# Patient Record
Sex: FEMALE | Race: BLACK OR AFRICAN AMERICAN | URBAN - NONMETROPOLITAN AREA
[De-identification: names, ages, dates, MRNs, and addresses within clinical notes are randomized per-mention and may not be internally consistent; named-entity substitution may affect disease eponyms.]

---

## 2022-09-30 ENCOUNTER — OFFICE VISIT (OUTPATIENT)
Dept: URBAN - NONMETROPOLITAN AREA CLINIC 1 | Facility: CLINIC | Age: 32
End: 2022-09-30
Payer: COMMERCIAL

## 2022-09-30 DIAGNOSIS — H52.13 MYOPIA, BILATERAL: Primary | ICD-10-CM

## 2022-09-30 PROCEDURE — 92004 COMPRE OPH EXAM NEW PT 1/>: CPT | Performed by: OPTOMETRIST

## 2022-09-30 ASSESSMENT — KERATOMETRY
OS: 45.13
OD: 44.88

## 2022-09-30 ASSESSMENT — VISUAL ACUITY
OS: 20/20
OD: 20/20

## 2022-09-30 ASSESSMENT — INTRAOCULAR PRESSURE
OS: 20
OD: 20

## 2022-09-30 NOTE — IMPRESSION/PLAN
Impression: Myopia, bilateral: H52.13. Plan: Educated pt on exam findings. Updated and finalized SRx. Educated pt on 1-2 week adaptation period with updated SRx. Pt expressed understanding. RTC 1 year for CE, or PRN. Discussed possible benefit of anti-fatigue lens to help with near fatigue. If headaches/migraines continue- recommend follow up with PCP. Pt declined dilation today. Educated pt the periphery of the back of the eye was unable to be assessed without dilation or photos, pt expressed understanding. Educated pt on signs/symptoms of RDs and RTC ASAP if changes in vision occur.

## 2024-04-04 ENCOUNTER — OFFICE VISIT (OUTPATIENT)
Dept: URBAN - NONMETROPOLITAN AREA CLINIC 1 | Facility: CLINIC | Age: 34
End: 2024-04-04
Payer: COMMERCIAL

## 2024-04-04 DIAGNOSIS — Z13.5 ENCOUNTER FOR SCREENING FOR EYE AND EAR DISORDERS: ICD-10-CM

## 2024-04-04 DIAGNOSIS — H52.13 MYOPIA, BILATERAL: Primary | ICD-10-CM

## 2024-04-04 PROCEDURE — 92014 COMPRE OPH EXAM EST PT 1/>: CPT | Performed by: OPTOMETRIST

## 2024-04-04 PROCEDURE — 92310 CONTACT LENS FITTING OU: CPT | Performed by: OPTOMETRIST

## 2024-04-04 ASSESSMENT — KERATOMETRY
OD: 44.75
OS: 44.88

## 2024-04-04 ASSESSMENT — INTRAOCULAR PRESSURE
OS: 18
OD: 19

## 2024-04-04 ASSESSMENT — VISUAL ACUITY
OS: 20/20
OD: 20/20

## 2025-05-29 ENCOUNTER — OFFICE VISIT (OUTPATIENT)
Dept: URBAN - NONMETROPOLITAN AREA CLINIC 1 | Facility: CLINIC | Age: 35
End: 2025-05-29
Payer: COMMERCIAL

## 2025-05-29 DIAGNOSIS — Z13.5 ENCOUNTER FOR SCREENING FOR EYE AND EAR DISORDERS: ICD-10-CM

## 2025-05-29 DIAGNOSIS — H52.13 MYOPIA, BILATERAL: Primary | ICD-10-CM

## 2025-05-29 PROCEDURE — 92014 COMPRE OPH EXAM EST PT 1/>: CPT | Performed by: OPTOMETRIST

## 2025-05-29 ASSESSMENT — KERATOMETRY
OS: 45.13
OD: 44.63

## 2025-05-29 ASSESSMENT — INTRAOCULAR PRESSURE
OD: 18
OS: 17

## 2025-05-29 ASSESSMENT — VISUAL ACUITY
OS: 20/20
OD: 20/20